# Patient Record
Sex: FEMALE | URBAN - METROPOLITAN AREA
[De-identification: names, ages, dates, MRNs, and addresses within clinical notes are randomized per-mention and may not be internally consistent; named-entity substitution may affect disease eponyms.]

---
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
## 2022-09-18 NOTE — PROGRESS NOTES
Subjective:      Patient ID: Judith Martinez is a 25 y.o. female. Hermelinda Pappas comes in today for therapeutic exercises for her L glute strain. She reports feeling an improvement in her pain. She did not have any pain when sleeping or when she woke up this morning. However, as soon as she began walking she started to experience pain again. Objective:   Ortho Exam      Assessment:     1. Muscle strain of left gluteal region, subsequent encounter      Plan: Today Vilma did:   LE FR  Hamstring neural glides  Sciatic neural glides  Prone glute activation x30  Prone hamstring curl 1.5# 3x10  3-way hip 1.5# 3x10 each  DL Glute bridge 3x10  Lateral walks with blue TB 4x  Monster walks with blue TB - had pain with TB*  SLB on airex [id number]  Premod & ice 15'    Vilma did well with most exercises. Still having pain with hip extension but otherwise progressing really well. Continue rehab next week due to her schedule.
No